# Patient Record
Sex: FEMALE | Race: WHITE | NOT HISPANIC OR LATINO | Employment: OTHER | URBAN - METROPOLITAN AREA
[De-identification: names, ages, dates, MRNs, and addresses within clinical notes are randomized per-mention and may not be internally consistent; named-entity substitution may affect disease eponyms.]

---

## 2017-07-25 ENCOUNTER — OFFICE VISIT (OUTPATIENT)
Dept: OBSTETRICS AND GYNECOLOGY | Facility: CLINIC | Age: 67
End: 2017-07-25
Payer: MEDICARE

## 2017-07-25 VITALS
HEIGHT: 66 IN | DIASTOLIC BLOOD PRESSURE: 80 MMHG | BODY MASS INDEX: 25.58 KG/M2 | WEIGHT: 159.19 LBS | SYSTOLIC BLOOD PRESSURE: 146 MMHG

## 2017-07-25 DIAGNOSIS — N95.2 POSTMENOPAUSAL ATROPHIC VAGINITIS: ICD-10-CM

## 2017-07-25 DIAGNOSIS — Z12.31 ENCOUNTER FOR SCREENING MAMMOGRAM FOR MALIGNANT NEOPLASM OF BREAST: ICD-10-CM

## 2017-07-25 DIAGNOSIS — Z01.419 WELL WOMAN EXAM WITH ROUTINE GYNECOLOGICAL EXAM: Primary | ICD-10-CM

## 2017-07-25 DIAGNOSIS — Z80.41 FAMILY HISTORY OF OVARIAN CANCER: ICD-10-CM

## 2017-07-25 DIAGNOSIS — Z03.89 ENCOUNTER FOR OBSERVATION FOR OTHER SUSPECTED DISEASES AND CONDITIONS RULED OUT: ICD-10-CM

## 2017-07-25 DIAGNOSIS — Z80.3 FAMILY HISTORY OF BREAST CANCER IN FIRST DEGREE RELATIVE: ICD-10-CM

## 2017-07-25 PROCEDURE — 99999 PR PBB SHADOW E&M-NEW PATIENT-LVL III: CPT | Mod: PBBFAC,,, | Performed by: OBSTETRICS & GYNECOLOGY

## 2017-07-25 PROCEDURE — G0101 CA SCREEN;PELVIC/BREAST EXAM: HCPCS | Mod: S$GLB,,, | Performed by: OBSTETRICS & GYNECOLOGY

## 2017-07-25 RX ORDER — BIMATOPROST 3 UG/ML
SOLUTION TOPICAL
COMMUNITY
Start: 2017-07-19 | End: 2018-07-19

## 2017-07-25 RX ORDER — GLUCOSAMINE/CHONDRO SU A 500-400 MG
1 TABLET ORAL
COMMUNITY

## 2017-07-25 RX ORDER — CALCIUM CARBONATE/VITAMIN D3 250-3.125
1 TABLET ORAL
COMMUNITY

## 2017-07-25 RX ORDER — MELOXICAM 15 MG/1
15 TABLET ORAL DAILY
Refills: 1 | COMMUNITY
Start: 2017-04-22

## 2017-07-25 RX ORDER — DILTIAZEM HYDROCHLORIDE 300 MG/1
300 CAPSULE, COATED, EXTENDED RELEASE ORAL DAILY
Refills: 0 | COMMUNITY
Start: 2017-04-17

## 2017-07-25 RX ORDER — MULTIVITAMIN
1 TABLET ORAL
COMMUNITY

## 2017-07-25 RX ORDER — ESCITALOPRAM OXALATE 10 MG/1
10 TABLET ORAL
COMMUNITY
Start: 2017-05-15

## 2017-07-25 NOTE — PROGRESS NOTES
Subjective:       Patient ID: Vanessa Dixon is a 67 y.o. female.    Chief Complaint:  Annual Exam      History of Present Illness  HPI  Presents for well-woman exam.  Patient is postmenopausal.  Has a strong family history of breast cancer in her mother and one of her sisters.  Her other sister had ovarian cancer.  Pt states the sister with breast cancer underwent genetic testing, and testing was negative.  Patient saw Dr. Pippa Deleon for abnormal MMG, and required breast MRI and breast ultrasound a few years ago.  Last MMG in  was a 3D MMG and was normal.  Pt was advised by the breast specialist to do 3D MMG instead of routine 2D screening MMG.  Pt complains of vaginal dryness and pain during intercourse.  She recently bought lubricant OTC, and plans to try that the next time she has intercourse.  Last pap:  normal; no hx of cervical dysplasia  Last colonoscopy:  10 years ago, normal; pt will schedule her screening colonoscopy this year with her usual gastroenterologist.  Declines referral within [facility].  Last DXA :  normal    GYN & OB History  No LMP recorded. Patient is postmenopausal.   Date of Last Pap: No result found    OB History    Para Term  AB Living   2 1 1   1 1   SAB TAB Ectopic Multiple Live Births   1       1      # Outcome Date GA Lbr Darnell/2nd Weight Sex Delivery Anes PTL Lv   2 SAB            1 Term      Vag-Spont   JUAN          Review of Systems  Review of Systems   Constitutional: Negative for fatigue, fever and unexpected weight change.   Gastrointestinal: Negative for abdominal pain, bloating, blood in stool, constipation, diarrhea, nausea and vomiting.   Endocrine: Negative for hot flashes.   Genitourinary: Positive for dyspareunia and vaginal pain. Negative for decreased libido, dysuria, flank pain, frequency, genital sores, hematuria, pelvic pain, urgency, vaginal bleeding, vaginal discharge, urinary incontinence, postcoital bleeding,  postmenopausal bleeding and vaginal odor.   Skin:  Negative for rash and hair changes.   Psychiatric/Behavioral: Negative for depression. The patient is not nervous/anxious.    Breast: Negative for breast mass, breast pain, nipple discharge and skin changes          Objective:    Physical Exam:   Constitutional: She is oriented to person, place, and time. She appears well-developed and well-nourished. No distress.      Neck: Neck supple. No thyromegaly present.     Pulmonary/Chest: Right breast exhibits no inverted nipple, no mass, no nipple discharge, no skin change, no tenderness, no bleeding and no swelling. Left breast exhibits no inverted nipple, no mass, no nipple discharge, no skin change, no tenderness, no bleeding and no swelling. Breasts are symmetrical.        Abdominal: Soft. She exhibits no distension and no mass. There is no tenderness. There is no rebound and no guarding.     Genitourinary: Pelvic exam was performed with patient supine. There is no rash, tenderness, lesion or injury on the right labia. There is no rash, tenderness, lesion or injury on the left labia. Uterus is not deviated, not enlarged, not fixed, not tender, not hosting fibroids and not experiencing uterine prolapse. Cervix is normal. Right adnexum displays no mass, no tenderness and no fullness. Left adnexum displays no mass, no tenderness and no fullness. No erythema (mucosa pale, thin, and atrophic), tenderness, bleeding, rectocele or cystocele in the vagina. No foreign body in the vagina. No signs of injury around the vagina. No vaginal discharge found. Cervix exhibits no motion tenderness, no discharge and no friability.        Uterus Size: 6 cm      Lymphadenopathy:        Right: No inguinal adenopathy present.        Left: No inguinal adenopathy present.    Neurological: She is alert and oriented to person, place, and time.     Psychiatric: She has a normal mood and affect.          Assessment:        1. Well woman exam with  routine gynecological exam    2. Family history of breast cancer in first degree relative    3. Family history of ovarian cancer    4. Breast cancer screening    5. Encounter for screening mammogram for malignant neoplasm of breast     6. Postmenopausal atrophic vaginitis                Plan:      Vanessa was seen today for annual exam.    Diagnoses and all orders for this visit:    Well woman exam with routine gynecological exam    Family history of breast cancer in first degree relative  -     Mammo Tomosysnthesis Only Bilat; Future    Family history of ovarian cancer    Encounter for screening mammogram for malignant neoplasm of breast   -     Cancel: Mammo Digital Screening Bilat with CAD; Future    Postmenopausal atrophic vaginitis    Encounter for observation for other suspected diseases and conditions ruled out   -     Mammo Tomosysnthesis Only Bilat; Future     Pt no longer needs pap smears.  Needs a pelvic exam annually given her family history.  Advised on annual CBE and annual mammogram.  Discussed OTC remedies for vaginal atrophy including water based lubricant and Replens.  Pt wants to try those first.  I also discussed local vaginal estrogen replacement.  Pt will let me know if OTC remedies don't help.  RTC 1 year or sooner prn.

## 2017-07-25 NOTE — PATIENT INSTRUCTIONS
Atrophic Vaginitis    Atrophic vaginitis means the walls of your vagina have become thin. This happens when your body makes too little of the hormone estrogen. Menopause or surgical removal of the ovaries are the most common causes for a drop in estrogen. Breastfeeding can also cause the hormone level to drop.  Symptoms of atrophic vaginitis include:  · Dryness, soreness, burning, or itching in the vagina  · Vaginal discharge  Sex can be uncomfortable, even painful. After sex, you may have bleeding from your vagina. You may also have burning or pain when you urinate.  Home care  Your health care provider may recommend 1 or more of these as treatment:  · Vaginal creams, lotions, and lubricants. These products help relieve vaginal dryness. They dont need a prescription. They can be found in the personal care section of most drugstores. Creams and lotions are used daily to help keep the vagina moist. Lubricants help reduce dryness and pain during sex. Choose water-based lubricants. Dont use petroleum jelly, mineral oil, or other oils. These increase the chance of infection.   · Hormone therapy (HT). HT increases the amount of estrogen in your body. This can help manage or relieve symptoms. HT can be given in pill form. It may be given as a lotion, cream, ring put into the vagina, or a patch on the skin. The risks and benefits of HT vary for each woman. For instance, your risk may be higher if you have had breast cancer. Discuss this treatment with your health care provider. Not every woman can use HT.  You dont need to abstain from sex. In fact, regular sex can help keep vaginal tissues healthy. Take steps to make sex more comfortable by using water-based lubricants.  Preventing infections  Atrophic vaginitis makes an infection of the vagina or the urinary tract more likely. To help reduce your risk:  · Keep your genitals clean. When you bathe, wash the outside of your vagina with mild soap and water. Clean gently  between the folds of your vagina.  · Wipe from front to back after a bowel movement.  · Dont douche unless your health care provider tells you to.  · Avoid scented toilet paper, scented vaginal sprays, and scented tampons.  · Avoid wearing clothes that are tight in the crotch. These include pantyhose, jeans, and leggings. Wear cotton underwear. Change it every day.  Follow-up care  Follow up with your health care provider, or as advised.  When to seek medical advice  Call your health care provider right away if any of these occur:  · Fever of 100.4°F (38°C) or higher, or as directed by your health care provider  · Symptoms dont go away or get worse even with treatment  · Vaginal area swells or becomes painful  · Vaginal area bleeds, but not because of your period  · Bad-smelling discharge from the vagina  · Pain or burning when you urinate or you have trouble passing urine  · Open sores develop around vagina   Date Last Reviewed: 12/23/2014  © 1240-0155 [employer] [address] [place] [address], [place] [address]. All rights reserved. This information is not intended as a substitute for professional medical care. Always follow your healthcare professional's instructions.